# Patient Record
Sex: MALE | Race: WHITE | Employment: FULL TIME | URBAN - METROPOLITAN AREA
[De-identification: names, ages, dates, MRNs, and addresses within clinical notes are randomized per-mention and may not be internally consistent; named-entity substitution may affect disease eponyms.]

---

## 2023-01-25 ENCOUNTER — OFFICE VISIT (OUTPATIENT)
Dept: WOUND CARE | Facility: HOSPITAL | Age: 53
End: 2023-01-25
Attending: SURGERY
Payer: COMMERCIAL

## 2023-01-25 DIAGNOSIS — Z71.89 ENCOUNTER FOR OSTOMY CARE EDUCATION: Primary | ICD-10-CM

## 2023-01-25 PROCEDURE — 99214 OFFICE O/P EST MOD 30 MIN: CPT

## 2023-01-25 NOTE — PROGRESS NOTES
[facility]  Report of Pre-op Ostomy Nurse Consultation    Vivek Recinos III Patient Status:  Wound Series    1970 MRN [id number]   Location [address] [facility] Attending Chico Lee MD     History of Present Illness:  Vivek Recinos III is a a(n) 50year old male. Patient is here for pre op stoma site marking and education on ileostomy care. Spouse in the room. History:  No past medical history on file. No past surgical history on file. Labs:       No results found for: A1C      Assessment:    Patient seen for pre-op Stoma site marking and teaching regarding  ileostomy care. Pt instructed on ileostomy routine care and expected function. Verbal, hands on and written literature reviewed with patient and his spouse. Discussed [employer] program.Patient is agreeable to signing up. Stoma site marked per MD order, pt was instructed that these markings are guidelines but surgeon will choose final location during surgery. Abdomen examined with patient laying, sitting and standing. Stoma sites marked to all four quadrants of the abdomen. These sites were in 1. Rectus muscle, 2. In patients flattest pouching plane and in patients visual field. Surgical marker was used and covered with Transparent dressing. Questions answered. Plan: Will follow post-op as needed Surgery planned for  per patient. Thank you for allowing me to participate in the care of your patient. Time Spent  50 minutes , Thank you.     Sarita Suárez RN BSN [facility]  Wound/Ostomy care pager [phone]  [phone]